# Patient Record
Sex: MALE | Race: WHITE | NOT HISPANIC OR LATINO | Employment: OTHER | URBAN - METROPOLITAN AREA
[De-identification: names, ages, dates, MRNs, and addresses within clinical notes are randomized per-mention and may not be internally consistent; named-entity substitution may affect disease eponyms.]

---

## 2017-01-10 ENCOUNTER — TELEPHONE (OUTPATIENT)
Dept: PLASTIC SURGERY | Facility: CLINIC | Age: 72
End: 2017-01-10

## 2017-01-10 NOTE — TELEPHONE ENCOUNTER
----- Message from Sanchez Franz sent at 1/10/2017 10:22 AM CST -----  Contact: Wife  Caller said pt is in ICU in [place]. Caller said she needs to get instructions for ppl at the hospital and some random questions. Please call [phone]

## 2017-01-10 NOTE — TELEPHONE ENCOUNTER
Spoke to pt's wife re: special care instructions from  while pt is an admit in his local hospital. Informed her that the only instruction that  has is to have the pt on a clinitron bed.

## 2017-01-18 ENCOUNTER — OFFICE VISIT (OUTPATIENT)
Dept: PLASTIC SURGERY | Facility: CLINIC | Age: 72
End: 2017-01-18
Payer: MEDICARE

## 2017-01-18 VITALS — SYSTOLIC BLOOD PRESSURE: 132 MMHG | DIASTOLIC BLOOD PRESSURE: 79 MMHG | HEART RATE: 97 BPM | TEMPERATURE: 99 F

## 2017-01-18 DIAGNOSIS — Z09 SURGERY FOLLOW-UP EXAMINATION: Primary | ICD-10-CM

## 2017-01-18 PROCEDURE — 99024 POSTOP FOLLOW-UP VISIT: CPT | Mod: ,,, | Performed by: SURGERY

## 2017-01-18 PROCEDURE — 99212 OFFICE O/P EST SF 10 MIN: CPT | Mod: PBBFAC,PO | Performed by: SURGERY

## 2017-01-18 PROCEDURE — 99999 PR PBB SHADOW E&M-EST. PATIENT-LVL II: CPT | Mod: PBBFAC,,, | Performed by: SURGERY

## 2017-01-18 NOTE — PROGRESS NOTES
Bret MEERA Best Jr. [facility] for a follow up visit status post closure of L ischial pressure sore on 11/16/16. Doing well. Since last visit was admitted for UTI and sepsis.    Vitals:    01/18/17 0941   BP: 132/79   Pulse: 97   Temp: 98.6 °F (37 °C)     Current Outpatient Prescriptions on File Prior to Visit   Medication Sig Dispense Refill    AMPICILLIN SODIUM/SULBACTAM NA (AMPICILLIN/SULBACTAM 3 G/100 ML NS, READY TO MIX,) Inject 100 mLs (3 g total) into the vein every 6 (six) hours. 65995 mL 0    baclofen (LIORESAL) 10 MG tablet Take 1 tablet (10 mg total) by mouth 3 (three) times daily. 60 tablet 6    BUTRANS 10 mcg/hour PTWK Place 10 mcg/hr onto the skin every 7 days. 10 patch 5    diphenhydrAMINE (BENADRYL) 25 mg capsule Take 1 each (25 mg total) by mouth every 6 (six) hours as needed for Itching.  0    docusate sodium (COLACE) 100 MG capsule Take 1 capsule (100 mg total) by mouth 3 (three) times daily.  0    gabapentin (NEURONTIN) 300 MG capsule Take 1 capsule (300 mg total) by mouth 3 (three) times daily. 90 capsule 11    ondansetron (ZOFRAN) 4 MG tablet Take 1 tablet (4 mg total) by mouth every 8 (eight) hours as needed for Nausea. 30 tablet 1    senna-docusate 8.6-50 mg (PERICOLACE) 8.6-50 mg per tablet Take 1 tablet by mouth 2 (two) times daily as needed for Constipation.      tolterodine (DETROL) 1 MG Tab Take 1 tablet (1 mg total) by mouth 2 (two) times daily. 30 tablet 11    tramadol (ULTRAM) 50 mg tablet Take 1 tablet (50 mg total) by mouth every 8 (eight) hours as needed for Pain. 30 tablet 2    trazodone (DESYREL) 50 MG tablet Take 0.5 tablets (25 mg total) by mouth every evening. 15 tablet 11     No current facility-administered medications on file prior to visit.      Patient Active Problem List   Diagnosis    Pressure sore    Decubitus ulcer of ankle, stage 4    Osteomyelitis    Decubitus ulcer of sacral region, stage 4    Enterococcus faecalis  infection    Bleeding from ischial flap    Symptomatic anemia    Left ischial pressure sore     Past Surgical History   Procedure Laterality Date    Back surgery      Colostomy      Knee surgery Left     Hip surgery Left    DATE OF PROCEDURE: 11/16/2016.  PREOPERATIVE DIAGNOSIS: Left ischial pressure sore.  POSTOPERATIVE DIAGNOSIS: Left ischial pressure sore.  PROCEDURES PERFORMED:  1. Irrigation and debridement of wound including skin, subcutaneous tissue and   muscle, measuring approximately 6 cm x 6 cm x 5 cm.  2. Ischiectomy.  3. Closure using a fasciocutaneous flap.  4. Advancement flap closure of the thigh measuring approximately 15 cm x 8 cm.     SURGEON: Alessandro Bardales M.D.  ANESTHESIA: General.    Doing well today. All sutures and staples removed today. Flap healing excellent.    Follow up 6-8 weeks.

## 2017-01-18 NOTE — PROGRESS NOTES
I personally have examined the patient and agree with the resident's findings and plan of action.

## 2017-03-15 ENCOUNTER — OFFICE VISIT (OUTPATIENT)
Dept: PLASTIC SURGERY | Facility: CLINIC | Age: 72
End: 2017-03-15
Payer: MEDICARE

## 2017-03-15 VITALS — TEMPERATURE: 98 F | SYSTOLIC BLOOD PRESSURE: 102 MMHG | HEART RATE: 81 BPM | DIASTOLIC BLOOD PRESSURE: 64 MMHG

## 2017-03-15 DIAGNOSIS — Z09 SURGERY FOLLOW-UP EXAMINATION: Primary | ICD-10-CM

## 2017-03-15 PROCEDURE — 99999 PR PBB SHADOW E&M-EST. PATIENT-LVL III: CPT | Mod: PBBFAC,,, | Performed by: PHYSICIAN ASSISTANT

## 2017-03-15 PROCEDURE — 99213 OFFICE O/P EST LOW 20 MIN: CPT | Mod: PBBFAC,PO | Performed by: PHYSICIAN ASSISTANT

## 2017-03-15 PROCEDURE — 99212 OFFICE O/P EST SF 10 MIN: CPT | Mod: S$PBB,,, | Performed by: PHYSICIAN ASSISTANT

## 2017-03-15 NOTE — PROGRESS NOTES
Bret Best Jr. [facility] for a follow up visit status post flap closure of L ischial pressure sore on 11/16/16    Vitals:    03/15/17 1405   BP: 102/64   Pulse: 81   Temp: 98.2 °F (36.8 °C)     Current Outpatient Prescriptions on File Prior to Visit   Medication Sig Dispense Refill    baclofen (LIORESAL) 10 MG tablet Take 1 tablet (10 mg total) by mouth 3 (three) times daily. 60 tablet 6    BUTRANS 10 mcg/hour PTWK Place 10 mcg/hr onto the skin every 7 days. 10 patch 5    diphenhydrAMINE (BENADRYL) 25 mg capsule Take 1 each (25 mg total) by mouth every 6 (six) hours as needed for Itching.  0    docusate sodium (COLACE) 100 MG capsule Take 1 capsule (100 mg total) by mouth 3 (three) times daily.  0    ondansetron (ZOFRAN) 4 MG tablet Take 1 tablet (4 mg total) by mouth every 8 (eight) hours as needed for Nausea. 30 tablet 1    senna-docusate 8.6-50 mg (PERICOLACE) 8.6-50 mg per tablet Take 1 tablet by mouth 2 (two) times daily as needed for Constipation.      tolterodine (DETROL) 1 MG Tab Take 1 tablet (1 mg total) by mouth 2 (two) times daily. 30 tablet 11    tramadol (ULTRAM) 50 mg tablet Take 1 tablet (50 mg total) by mouth every 8 (eight) hours as needed for Pain. 30 tablet 2    AMPICILLIN SODIUM/SULBACTAM NA (AMPICILLIN/SULBACTAM 3 G/100 ML NS, READY TO MIX,) Inject 100 mLs (3 g total) into the vein every 6 (six) hours. 01749 mL 0    gabapentin (NEURONTIN) 300 MG capsule Take 1 capsule (300 mg total) by mouth 3 (three) times daily. 90 capsule 11    trazodone (DESYREL) 50 MG tablet Take 0.5 tablets (25 mg total) by mouth every evening. 15 tablet 11     No current facility-administered medications on file prior to visit.      Patient Active Problem List   Diagnosis    Pressure sore    Decubitus ulcer of ankle, stage 4    Osteomyelitis    Decubitus ulcer of sacral region, stage 4    Enterococcus faecalis infection    Bleeding from ischial flap    Symptomatic  anemia    Left ischial pressure sore     Past Surgical History:   Procedure Laterality Date    BACK SURGERY      COLOSTOMY      HIP SURGERY Left     KNEE SURGERY Left        DATE OF PROCEDURE: 11/16/2016.     PREOPERATIVE DIAGNOSIS: Left ischial pressure sore.     POSTOPERATIVE DIAGNOSIS: Left ischial pressure sore.     PROCEDURES PERFORMED:  1. Irrigation and debridement of wound including skin, subcutaneous tissue and   muscle, measuring approximately 6 cm x 6 cm x 5 cm.  2. Ischiectomy.  3. Closure using a fasciocutaneous flap.  4. Advancement flap closure of the thigh measuring approximately 15 cm x 8 cm.     SURGEON: Alessandro Bardales M.D.     ANESTHESIA: General.    Doing well today. All incisions well healed. No sign of skin breakdown or ischemia. No drainage.     Advised patient and wife that everything looked great and to continue with current routine.     All questions were answered. He will return to clinic in 2 months. The patient was advised to call the clinic with any questions or concerns prior to their next visit.

## 2017-11-07 ENCOUNTER — TELEPHONE (OUTPATIENT)
Dept: SURGERY | Facility: CLINIC | Age: 72
End: 2017-11-07

## 2017-11-07 NOTE — TELEPHONE ENCOUNTER
Returned call to patient's wife, 2 mo PO appointment scheduled and confirmed, all questions answered at this time

## 2017-11-07 NOTE — TELEPHONE ENCOUNTER
----- Message from Deion Hernandez sent at 11/7/2017  3:17 PM CST -----  Contact: mignon/wife  [phone]//caller states that she needs to speak with nurse in ref to pts post op visit/please call/thank you

## 2024-04-17 DIAGNOSIS — M79.2 NEURALGIA AND NEURITIS, UNSPECIFIED: Primary | ICD-10-CM
